# Patient Record
Sex: MALE | URBAN - METROPOLITAN AREA
[De-identification: names, ages, dates, MRNs, and addresses within clinical notes are randomized per-mention and may not be internally consistent; named-entity substitution may affect disease eponyms.]

---

## 2019-10-29 ENCOUNTER — APPOINTMENT (RX ONLY)
Dept: URBAN - METROPOLITAN AREA CLINIC 2 | Facility: CLINIC | Age: 29
Setting detail: DERMATOLOGY
End: 2019-10-29

## 2019-10-29 DIAGNOSIS — D22 MELANOCYTIC NEVI: ICD-10-CM

## 2019-10-29 DIAGNOSIS — Z71.89 OTHER SPECIFIED COUNSELING: ICD-10-CM

## 2019-10-29 DIAGNOSIS — L81.4 OTHER MELANIN HYPERPIGMENTATION: ICD-10-CM

## 2019-10-29 PROBLEM — D22.5 MELANOCYTIC NEVI OF TRUNK: Status: ACTIVE | Noted: 2019-10-29

## 2019-10-29 PROBLEM — F32.9 MAJOR DEPRESSIVE DISORDER, SINGLE EPISODE, UNSPECIFIED: Status: ACTIVE | Noted: 2019-10-29

## 2019-10-29 PROBLEM — F41.9 ANXIETY DISORDER, UNSPECIFIED: Status: ACTIVE | Noted: 2019-10-29

## 2019-10-29 PROCEDURE — ? DIAGNOSIS COMMENT

## 2019-10-29 PROCEDURE — ? COUNSELING

## 2019-10-29 PROCEDURE — 99203 OFFICE O/P NEW LOW 30 MIN: CPT

## 2019-10-29 ASSESSMENT — LOCATION SIMPLE DESCRIPTION DERM
LOCATION SIMPLE: ABDOMEN
LOCATION SIMPLE: LEFT SHOULDER
LOCATION SIMPLE: UPPER BACK
LOCATION SIMPLE: RIGHT SHOULDER

## 2019-10-29 ASSESSMENT — LOCATION ZONE DERM
LOCATION ZONE: TRUNK
LOCATION ZONE: ARM

## 2019-10-29 ASSESSMENT — LOCATION DETAILED DESCRIPTION DERM
LOCATION DETAILED: RIGHT POSTERIOR SHOULDER
LOCATION DETAILED: LEFT POSTERIOR SHOULDER
LOCATION DETAILED: INFERIOR THORACIC SPINE
LOCATION DETAILED: LEFT LATERAL ABDOMEN

## 2023-08-10 ENCOUNTER — HOSPITAL ENCOUNTER (EMERGENCY)
Facility: HOSPITAL | Age: 33
Discharge: HOME | End: 2023-08-10
Attending: FAMILY MEDICINE
Payer: COMMERCIAL

## 2023-08-10 VITALS
OXYGEN SATURATION: 95 % | DIASTOLIC BLOOD PRESSURE: 88 MMHG | BODY MASS INDEX: 26.6 KG/M2 | SYSTOLIC BLOOD PRESSURE: 128 MMHG | HEIGHT: 71 IN | HEART RATE: 94 BPM | RESPIRATION RATE: 22 BRPM | WEIGHT: 190 LBS | TEMPERATURE: 97.5 F

## 2023-08-10 DIAGNOSIS — R19.7 BLOODY DIARRHEA: Primary | ICD-10-CM

## 2023-08-10 LAB
ALBUMIN SERPL-MCNC: 4.9 G/DL (ref 3.5–5.3)
ALP SERPL-CCNC: 57 U/L (ref 45–115)
ALT SERPL-CCNC: 12 U/L (ref 7–52)
ANION GAP SERPL CALC-SCNC: 9 MMOL/L (ref 3–11)
AST SERPL-CCNC: 15 U/L
BASOPHILS # BLD AUTO: 0.1 10*3/UL
BASOPHILS NFR BLD AUTO: 1.1 % (ref 0–2)
BILIRUB SERPL-MCNC: 0.41 MG/DL (ref 0.2–1.4)
BUN SERPL-MCNC: 15 MG/DL (ref 7–25)
CALCIUM ALBUM COR SERPL-MCNC: 8.6 MG/DL (ref 8.6–10.3)
CALCIUM SERPL-MCNC: 9.3 MG/DL (ref 8.6–10.3)
CHLORIDE SERPL-SCNC: 102 MMOL/L (ref 98–107)
CO2 SERPL-SCNC: 28 MMOL/L (ref 21–32)
CREAT SERPL-MCNC: 1.08 MG/DL (ref 0.7–1.3)
EGFRCR SERPLBLD CKD-EPI 2021: 92 ML/MIN/1.73M*2
EOSINOPHIL # BLD AUTO: 0.1 10*3/UL
EOSINOPHIL NFR BLD AUTO: 2 % (ref 0–3)
ERYTHROCYTE [DISTWIDTH] IN BLOOD BY AUTOMATED COUNT: 13.4 % (ref 11.5–15)
GLUCOSE SERPL-MCNC: 90 MG/DL (ref 70–105)
HCT VFR BLD AUTO: 43.5 % (ref 38–50)
HGB BLD-MCNC: 14.7 G/DL (ref 13.2–17.2)
LIPASE SERPL-CCNC: 26 U/L (ref 11–82)
LYMPHOCYTES # BLD AUTO: 2.1 10*3/UL
LYMPHOCYTES NFR BLD AUTO: 30.3 % (ref 15–47)
MCH RBC QN AUTO: 30.4 PG (ref 29–34)
MCHC RBC AUTO-ENTMCNC: 33.7 G/DL (ref 32–36)
MCV RBC AUTO: 90.2 FL (ref 82–97)
MONOCYTES # BLD AUTO: 0.7 10*3/UL
MONOCYTES NFR BLD AUTO: 11 % (ref 5–13)
NEUTROPHILS # BLD AUTO: 3.8 10*3/UL
NEUTROPHILS NFR BLD AUTO: 55.6 % (ref 46–70)
PLATELET # BLD AUTO: 332 10*3/UL (ref 130–350)
PMV BLD AUTO: 8 FL (ref 6.9–10.8)
POTASSIUM SERPL-SCNC: 3.6 MMOL/L (ref 3.5–5.1)
PROT SERPL-MCNC: 7.3 G/DL (ref 6–8.3)
RBC # BLD AUTO: 4.82 10*6/ΜL (ref 4.1–5.8)
SODIUM SERPL-SCNC: 139 MMOL/L (ref 135–145)
WBC # BLD AUTO: 6.8 10*3/UL (ref 3.7–9.6)

## 2023-08-10 PROCEDURE — 80053 COMPREHEN METABOLIC PANEL: CPT | Performed by: FAMILY MEDICINE

## 2023-08-10 PROCEDURE — 85025 COMPLETE CBC W/AUTO DIFF WBC: CPT | Performed by: FAMILY MEDICINE

## 2023-08-10 PROCEDURE — 83690 ASSAY OF LIPASE: CPT | Performed by: FAMILY MEDICINE

## 2023-08-10 PROCEDURE — 36415 COLL VENOUS BLD VENIPUNCTURE: CPT | Performed by: FAMILY MEDICINE

## 2023-08-10 PROCEDURE — 87507 IADNA-DNA/RNA PROBE TQ 12-25: CPT | Performed by: FAMILY MEDICINE

## 2023-08-10 PROCEDURE — 99283 EMERGENCY DEPT VISIT LOW MDM: CPT | Performed by: FAMILY MEDICINE

## 2023-08-10 RX ORDER — BUSPIRONE HYDROCHLORIDE 10 MG/1
10 TABLET ORAL 2 TIMES DAILY
COMMUNITY
Start: 2023-07-31

## 2023-08-10 RX ORDER — TRAZODONE HYDROCHLORIDE 100 MG/1
100 TABLET ORAL NIGHTLY PRN
COMMUNITY
Start: 2023-05-24

## 2023-08-10 RX ORDER — MODAFINIL 100 MG/1
100 TABLET ORAL DAILY
COMMUNITY
Start: 2023-05-18

## 2023-08-11 LAB
ADV 40+41 DNA STL QL NAA+NON-PROBE: NEGATIVE
ASTRO TYP 1-8 RNA STL QL NAA+NON-PROBE: NEGATIVE
C CAYETANENSIS DNA STL QL NAA+NON-PROBE: NEGATIVE
C COLI+JEJ+UPSA DNA STL QL NAA+NON-PROBE: NEGATIVE
C DIF TOX TCDA+TCDB STL QL NAA+NON-PROBE: NEGATIVE
CRYPTOSP DNA STL QL NAA+NON-PROBE: NEGATIVE
E HISTOLYT DNA STL QL NAA+NON-PROBE: NEGATIVE
EAEC PAA PLAS AGGR+AATA ST NAA+NON-PRB: NEGATIVE
EC STX1+STX2 GENES STL QL NAA+NON-PROBE: NEGATIVE
EPEC EAE GENE STL QL NAA+NON-PROBE: NEGATIVE
ETEC LTA+ST1A+ST1B TOX ST NAA+NON-PROBE: NEGATIVE
G LAMBLIA DNA STL QL NAA+NON-PROBE: NEGATIVE
NOROVIRUS GI+II RNA STL QL NAA+NON-PROBE: NEGATIVE
P SHIGELLOIDES DNA STL QL NAA+NON-PROBE: NEGATIVE
RVA RNA STL QL NAA+NON-PROBE: NEGATIVE
S ENT+BONG DNA STL QL NAA+NON-PROBE: NEGATIVE
SAPO I+II+IV+V RNA STL QL NAA+NON-PROBE: NEGATIVE
SHIGELLA SP+EIEC IPAH ST NAA+NON-PROBE: NEGATIVE
V CHOL+PARA+VUL DNA STL QL NAA+NON-PROBE: NEGATIVE
V CHOLERAE DNA STL QL NAA+NON-PROBE: NEGATIVE
Y ENTEROCOL DNA STL QL NAA+NON-PROBE: NEGATIVE

## 2023-08-11 NOTE — DISCHARGE INSTRUCTIONS
Blood work looks good  No leukocytosis  Normal liver and kidney function  Normal electrolytes    Your stool PCR is pending, results should take around 24 hours. If you haven't heard from us with results by then please call us at [phone]    If bloody stools worsening, you become dehydrated, develop fevers or chills or worsening abd pain please return for re-evaluation    Thank you for letting us care for you today

## 2023-08-11 NOTE — ED PROVIDER NOTES
HPI:  Chief Complaint   Patient presents with    Diarrhea     Intermittent N/V/D since July 4th after a cruise. Started 2 days ago again. Today had blood in stool. 2/10 abd cramping.         Jae is a 32-year-old male who comes in today with concerns of leg nausea, 1 episode of vomiting 2 days ago and diarrhea that seems to be getting worse.  Was actually sick after getting off of a cruise July 4 and while the nausea and vomiting and diarrhea resolved it has recently restarted.  Today he became very concerned because he had blood in the stool.  Cannot state whether it was mixed in or not because the stool has been liquid.  Several episodes of liquid stool.  Quite a bit of cramping also.  At this time 2 out of 10 pain but states it is very nonfocal minor.    No fevers or chills, no chest pain or shortness of breath.  No chronic medical conditions.  He is here for the [email or web address] and did ride his bike out from [place] where he lives.  No new or different foods that are dramatically different.  Minimal alcohol and no drugs.        HISTORY:  Past Medical History:   Diagnosis Date    TBI (traumatic brain injury) (CMS/[facility])        History reviewed. No pertinent surgical history.    History reviewed. No pertinent family history.    Social History     Tobacco Use    Smoking status: Unknown       ROS:  Negative except as described in HPI    PE:  ED Triage Vitals [08/10/23 2108]   Temp Heart Rate Resp BP SpO2   36.4 °C (97.5 °F) 94 22 128/88 95 %      Mean BP (mmHg) Temp Source Heart Rate Source Patient Position BP Location   -- Temporal -- -- --      FiO2 (%)       --           Physical Exam  Vitals and nursing note reviewed.   Constitutional:       General: He is not in acute distress.     Appearance: He is well-developed.   Cardiovascular:      Rate and Rhythm: Normal rate and regular rhythm.      Heart sounds: Normal heart sounds. No murmur heard.  Pulmonary:      Effort: Pulmonary effort is normal. No  respiratory distress.      Breath sounds: Normal breath sounds.   Abdominal:      General: Bowel sounds are normal.      Palpations: Abdomen is soft.      Tenderness: There is no abdominal tenderness.   Musculoskeletal:         General: Normal range of motion.      Cervical back: Normal range of motion and neck supple.   Skin:     General: Skin is warm and dry.      Capillary Refill: Capillary refill takes less than 2 seconds.   Neurological:      Mental Status: He is alert and oriented to person, place, and time.         ED LABS:  Labs Reviewed   COMPREHENSIVE METABOLIC PANEL - Normal       Result Value    Sodium 139      Potassium 3.6      Chloride 102      CO2 28      Anion Gap 9      BUN 15      Creatinine 1.08      Glucose 90      Calcium 9.3      AST 15      ALT (SGPT) 12      Alkaline Phosphatase 57      Total Protein 7.3      Albumin 4.9      Total Bilirubin 0.41      Corrected Calcium 8.6      eGFR 92      Narrative:     Calculation based on the 2021 Chronic Kidney Disease Epidemiology Collaboration (CKD-EPI) equation refit without adjustment for race.   LIPASE - Normal    Lipase 26     CBC WITH AUTO DIFFERENTIAL    WBC 6.8      RBC 4.82      Hemoglobin 14.7      Hematocrit 43.5      MCV 90.2      MCH 30.4      MCHC 33.7      RDW 13.4      Platelets 332      MPV 8.0      Neutrophils% 55.6      Lymphocytes% 30.3      Monocytes% 11.0      Eosinophils% 2.0      Basophils% 1.1      ANC (auto diff) 3.80      Lymphocytes Absolute 2.10      Monocytes Absolute 0.70      Eosinophils Absolute 0.10      Basophils Absolute 0.10     GI PATHOGEN PANEL FULL, PCR         ED IMAGES:  No orders to display     MDM:  Medical Decision Making  Jae is a 32-year-old male who presents with worsening diarrhea for the last 2 days.  Has had intermittent episodes since July 4.  Stools today were more frequent and were bloody so he presented for evaluation.  Nontoxic-appearing, no pain with palpation, no fevers or chills.  Pain is  mild and not getting worse.  We did basic labs to evaluate for leukocytosis, liver and kidney function and all were within normal range.  Shared decision making over imaging and patient states that he has not worried about it as long as workup is reassuring.  I agree and have been reassured that he has follow-up with his PCP next week.  Patient states he is comfortable with return precautions.  If he needs an antidiarrheal for the ride home he will try and minimize this and it would withhold if he has any fevers or chills or worsening abdominal pain.  No additional concerns or questions and understands return precautions.        Final diagnoses:   [R19.7] Bloody diarrhea     8/10/2023 10:08 PM                      Ese Grayson MD  08/10/23 1917